# Patient Record
Sex: FEMALE | Race: WHITE | NOT HISPANIC OR LATINO | Employment: FULL TIME | URBAN - METROPOLITAN AREA
[De-identification: names, ages, dates, MRNs, and addresses within clinical notes are randomized per-mention and may not be internally consistent; named-entity substitution may affect disease eponyms.]

---

## 2019-08-10 ENCOUNTER — OFFICE VISIT (OUTPATIENT)
Dept: URGENT CARE | Facility: CLINIC | Age: 69
End: 2019-08-10

## 2019-08-10 VITALS
HEART RATE: 72 BPM | RESPIRATION RATE: 18 BRPM | BODY MASS INDEX: 25.49 KG/M2 | TEMPERATURE: 98 F | DIASTOLIC BLOOD PRESSURE: 85 MMHG | SYSTOLIC BLOOD PRESSURE: 126 MMHG | HEIGHT: 61 IN | OXYGEN SATURATION: 98 % | WEIGHT: 135 LBS

## 2019-08-10 DIAGNOSIS — H61.21 HEARING LOSS OF RIGHT EAR DUE TO CERUMEN IMPACTION: Primary | ICD-10-CM

## 2019-08-10 PROBLEM — I10 ESSENTIAL HYPERTENSION: Status: ACTIVE | Noted: 2019-08-10

## 2019-08-10 PROCEDURE — 99213 OFFICE O/P EST LOW 20 MIN: CPT | Mod: S$GLB,,, | Performed by: FAMILY MEDICINE

## 2019-08-10 PROCEDURE — 69209 EAR CERUMEN REMOVAL: ICD-10-PCS | Mod: RT,S$GLB,, | Performed by: FAMILY MEDICINE

## 2019-08-10 PROCEDURE — 69209 REMOVE IMPACTED EAR WAX UNI: CPT | Mod: RT,S$GLB,, | Performed by: FAMILY MEDICINE

## 2019-08-10 PROCEDURE — 99213 PR OFFICE/OUTPT VISIT, EST, LEVL III, 20-29 MIN: ICD-10-PCS | Mod: S$GLB,,, | Performed by: FAMILY MEDICINE

## 2019-08-10 RX ORDER — LEVOTHYROXINE SODIUM 75 UG/1
TABLET ORAL
COMMUNITY
Start: 2019-05-15

## 2019-08-10 RX ORDER — ATORVASTATIN CALCIUM 10 MG
TABLET ORAL
COMMUNITY
Start: 2019-05-16

## 2019-08-10 RX ORDER — ASPIRIN 81 MG/1
81 TABLET ORAL
COMMUNITY

## 2019-08-10 RX ORDER — METOPROLOL SUCCINATE 50 MG/1
TABLET, EXTENDED RELEASE ORAL
COMMUNITY
Start: 2017-11-16

## 2019-08-10 RX ORDER — AMLODIPINE BESYLATE 5 MG/1
TABLET ORAL
COMMUNITY
Start: 2017-10-24

## 2019-08-10 RX ORDER — VENLAFAXINE 75 MG/1
75 TABLET ORAL
COMMUNITY

## 2019-08-10 RX ORDER — CHOLECALCIFEROL (VITAMIN D3) 25 MCG
1000 TABLET ORAL
COMMUNITY

## 2019-08-10 RX ORDER — LEVOTHYROXINE SODIUM 75 UG/1
75 TABLET ORAL
COMMUNITY

## 2019-08-10 NOTE — PROGRESS NOTES
"Subjective:       Patient ID: Kaykay New is a 69 y.o. female.    Vitals:  height is 5' 1" (1.549 m) and weight is 61.2 kg (135 lb). Her oral temperature is 97.8 °F (36.6 °C). Her blood pressure is 126/85 and her pulse is 72. Her respiration is 18 and oxygen saturation is 98%.     Chief Complaint: Ear Problem    Patient present today with right ear problem . Patient states she has had fluid in her right ear for a week now. Patient states its affecting her hearing. Patient has tried taking sudafed but no relief. Patient tried draining her ear as well but no relief.     Otalgia    There is pain in the right ear. The current episode started 1 to 4 weeks ago. The problem has been gradually worsening. The pain is at a severity of 2/10. Associated symptoms include ear discharge and hearing loss. Pertinent negatives include no coughing, diarrhea, headaches, rash, sore throat or vomiting. The treatment provided no relief.       Constitution: Negative for chills, fatigue and fever.   HENT: Positive for ear pain, ear discharge and hearing loss. Negative for congestion and sore throat.    Neck: Negative for painful lymph nodes.   Cardiovascular: Negative for chest pain and leg swelling.   Eyes: Negative for double vision and blurred vision.   Respiratory: Negative for cough and shortness of breath.    Gastrointestinal: Negative for nausea, vomiting and diarrhea.   Genitourinary: Negative for dysuria, frequency, urgency and history of kidney stones.   Musculoskeletal: Negative for joint pain, joint swelling, muscle cramps and muscle ache.   Skin: Negative for color change, pale, rash and bruising.   Allergic/Immunologic: Negative for seasonal allergies.   Neurological: Negative for dizziness, history of vertigo, light-headedness, passing out and headaches.   Hematologic/Lymphatic: Negative for swollen lymph nodes.   Psychiatric/Behavioral: Negative for nervous/anxious, sleep disturbance and depression. The patient is not " nervous/anxious.        Objective:      Physical Exam   Constitutional: She is oriented to person, place, and time. She appears well-developed and well-nourished.   HENT:   Head: Normocephalic and atraumatic.   Nose: Nose normal.   Mouth/Throat: Oropharynx is clear and moist.   Rt EOC occluded with wax, Lt EOC with a moderate amount of wax-TM clear   Eyes: Pupils are equal, round, and reactive to light. EOM are normal.   Neck: Normal range of motion. Neck supple.   Cardiovascular: Normal rate, regular rhythm, normal heart sounds and intact distal pulses.   Pulmonary/Chest: Effort normal and breath sounds normal.   Neurological: She is alert and oriented to person, place, and time.   Skin: Skin is warm and dry.   Psychiatric: She has a normal mood and affect. Her behavior is normal. Judgment and thought content normal.   Nursing note and vitals reviewed.    Ear Cerumen Removal  Date/Time: 8/10/2019 1:42 PM  Performed by: Yael Francisco DO  Authorized by: Yael Francisco, DO     Consent Done?:  Yes (Verbal)  Medication Used:  Other (colase)  Location details:  Right ear  Procedure type: irrigation    Cerumen  Removal Results:  Cerumen completely removed  Patient tolerance:  Patient tolerated the procedure well with no immediate complications      Assessment:       1. Hearing loss of right ear due to cerumen impaction        Plan:         Hearing loss of right ear due to cerumen impaction    Other orders  -     Ear Cerumen Removal

## 2022-06-09 ENCOUNTER — HOSPITAL ENCOUNTER (EMERGENCY)
Facility: HOSPITAL | Age: 72
Discharge: HOME | End: 2022-06-09
Attending: EMERGENCY MEDICINE
Payer: COMMERCIAL

## 2022-06-09 VITALS
HEIGHT: 61 IN | TEMPERATURE: 99 F | WEIGHT: 130 LBS | RESPIRATION RATE: 16 BRPM | SYSTOLIC BLOOD PRESSURE: 175 MMHG | HEART RATE: 83 BPM | DIASTOLIC BLOOD PRESSURE: 90 MMHG | OXYGEN SATURATION: 97 % | BODY MASS INDEX: 24.55 KG/M2

## 2022-06-09 DIAGNOSIS — J84.9 INTERSTITIAL PNEUMONIA: Primary | ICD-10-CM

## 2022-06-09 DIAGNOSIS — M54.9 BACK PAIN: ICD-10-CM

## 2022-06-09 PROCEDURE — 63700000 PHARM REV CODE 250 ALT 637 W/O HCPCS: Performed by: PHYSICIAN ASSISTANT

## 2022-06-09 PROCEDURE — 96372 THER/PROPH/DIAG INJ SC/IM: CPT | Performed by: PHYSICIAN ASSISTANT

## 2022-06-09 PROCEDURE — 99284 EMERGENCY DEPT VISIT MOD MDM: CPT | Mod: ,,, | Performed by: PHYSICIAN ASSISTANT

## 2022-06-09 PROCEDURE — 99284 EMERGENCY DEPT VISIT MOD MDM: CPT | Mod: 25

## 2022-06-09 PROCEDURE — 99284 PR EMERGENCY DEPT VISIT,LEVEL IV: ICD-10-PCS | Mod: ,,, | Performed by: PHYSICIAN ASSISTANT

## 2022-06-09 PROCEDURE — 25000003 PHARM REV CODE 250: Performed by: PHYSICIAN ASSISTANT

## 2022-06-09 PROCEDURE — 63600175 PHARM REV CODE 636 W HCPCS: Performed by: PHYSICIAN ASSISTANT

## 2022-06-09 RX ORDER — AZITHROMYCIN 250 MG/1
500 TABLET, FILM COATED ORAL
Status: COMPLETED | OUTPATIENT
Start: 2022-06-09 | End: 2022-06-09

## 2022-06-09 RX ORDER — AZITHROMYCIN 250 MG/1
250 TABLET, FILM COATED ORAL DAILY
Qty: 4 TABLET | Refills: 0 | Status: SHIPPED | OUTPATIENT
Start: 2022-06-09

## 2022-06-09 RX ORDER — KETOROLAC TROMETHAMINE 30 MG/ML
10 INJECTION, SOLUTION INTRAMUSCULAR; INTRAVENOUS
Status: COMPLETED | OUTPATIENT
Start: 2022-06-09 | End: 2022-06-09

## 2022-06-09 RX ORDER — LIDOCAINE 50 MG/G
1 PATCH TOPICAL
Status: DISCONTINUED | OUTPATIENT
Start: 2022-06-09 | End: 2022-06-09 | Stop reason: HOSPADM

## 2022-06-09 RX ORDER — METHOCARBAMOL 500 MG/1
500-1000 TABLET, FILM COATED ORAL 2 TIMES DAILY PRN
Qty: 20 TABLET | Refills: 0 | Status: SHIPPED | OUTPATIENT
Start: 2022-06-09 | End: 2022-06-14

## 2022-06-09 RX ORDER — METHOCARBAMOL 500 MG/1
500 TABLET, FILM COATED ORAL
Status: COMPLETED | OUTPATIENT
Start: 2022-06-09 | End: 2022-06-09

## 2022-06-09 RX ADMIN — METHOCARBAMOL 500 MG: 500 TABLET ORAL at 05:06

## 2022-06-09 RX ADMIN — KETOROLAC TROMETHAMINE 10 MG: 30 INJECTION, SOLUTION INTRAMUSCULAR at 05:06

## 2022-06-09 RX ADMIN — AZITHROMYCIN MONOHYDRATE 500 MG: 250 TABLET ORAL at 06:06

## 2022-06-09 RX ADMIN — LIDOCAINE 1 PATCH: 50 PATCH CUTANEOUS at 05:06

## 2022-06-09 NOTE — ED NOTES
Pt is AAOx4. Pt states back pain started around ~Tuesday. States it's primarily on the left side. Pt reports that she has had trouble standing and walking. Pt reports taking Ibuprofen and Naproxen w/ no relief. Denies injury. Reports similar episode ~5 months ago.     Patient identifiers for [facility] 72 y.o. female checked and correct.  Chief Complaint   Patient presents with    Back Pain     Pt c/o back pain & spasms.      Past Medical History:   Diagnosis Date    Cancer 2011    breast cancer    Hypertension     Thyroid disease      Allergies reported: Review of patient's allergies indicates:  No Known Allergies

## 2022-06-09 NOTE — ED PROVIDER NOTES
Encounter Date: 6/9/2022       History     Chief Complaint   Patient presents with    Back Pain     Pt c/o back pain & spasms.      This is a 72 y.o. year old female with a PMH of HTN, breast ca and thyroid disease and cervical spine disease (s/p cervical fusion) who presents to the ED with a chief complaint of back pain. Patient reports a 2 day history of gradual onset left sided upper back pain. The pain feels like a muscle spasm and does not radiate. She describes a quick tightening with certain movements. Patient rates the pain 6/10. The pain improved with stretching, naprosyn, and ibuprofen. She tested positive for Covid 19 on May 8th after traveling. She mentions residual shortness of breath and cough, especially when lying down at night. She denies trauma, fever, chills, chest pain, nausea, abdominal pain, joint swelling, edema, calf pain, rashes, focal weakness or numbness. She is a nonsmoker.        Review of patient's allergies indicates:  No Known Allergies  Past Medical History:   Diagnosis Date    Cancer 2011    breast cancer    Hypertension     Thyroid disease      Past Surgical History:   Procedure Laterality Date    BREAST SURGERY       Family History   Problem Relation Age of Onset    No Known Problems Mother     No Known Problems Father      Social History     Tobacco Use    Smoking status: Never Smoker    Smokeless tobacco: Never Used   Substance Use Topics    Alcohol use: Not Currently     Review of Systems   Constitutional: Negative for chills and fever.   HENT: Negative for sore throat.    Respiratory: Positive for cough and shortness of breath.    Cardiovascular: Negative for chest pain.   Gastrointestinal: Negative for nausea.   Genitourinary: Negative for dysuria.   Musculoskeletal: Positive for back pain.   Skin: Negative for rash.   Neurological: Negative for weakness.   Hematological: Does not bruise/bleed easily.       Physical Exam     Initial Vitals [06/09/22 1645]   BP Pulse  Resp Temp SpO2   (!) 176/90 90 16 98.5 °F (36.9 °C) 96 %      MAP       --         Physical Exam    Constitutional: She appears well-developed and well-nourished. No distress. Face mask in place.   HENT:   Head: Atraumatic.   Right Ear: Tympanic membrane and ear canal normal.   Left Ear: Tympanic membrane and ear canal normal.   Mouth/Throat: Uvula is midline. Posterior oropharyngeal erythema present. No oropharyngeal exudate.   Eyes: Conjunctivae and EOM are normal. Pupils are equal, round, and reactive to light.   Cardiovascular: Normal rate, regular rhythm and normal heart sounds.   Pulmonary/Chest: No respiratory distress. She has decreased breath sounds in the left middle field and the left lower field. She has no wheezes. She has no rhonchi. She has rales in the left middle field and the left lower field.   Abdominal: Abdomen is soft. Bowel sounds are normal. There is no abdominal tenderness.   Musculoskeletal:      Thoracic back: Spasms and tenderness present. No bony tenderness. Normal range of motion.        Back:      Neurological: She is alert and oriented to person, place, and time. She has normal strength. No sensory deficit. Coordination and gait normal.   Skin: Skin is warm and dry. No rash noted.         ED Course   Procedures  Labs Reviewed - No data to display       Imaging Results          X-Ray Chest PA And Lateral (Final result)  Result time 06/09/22 18:27:51    Final result by Rene Singh MD (06/09/22 18:27:51)                 Impression:      1. No acute cardiopulmonary process, please see above.      Electronically signed by: Rene Singh MD  Date:    06/09/2022  Time:    18:27             Narrative:    EXAMINATION:  XR CHEST PA AND LATERAL    CLINICAL HISTORY:  Dorsalgia, unspecified    TECHNIQUE:  PA and lateral views of the chest were performed.    COMPARISON:  None    FINDINGS:  The cardiomediastinal silhouette is not enlarged.  There is no pleural effusion.  The trachea is  midline.  The lungs are symmetrically expanded bilaterally with mildly coarse interstitial attenuation projected over the left mid to lower lung zones related to attenuation from breast prosthesis..  No large focal consolidation seen.  There is no pneumothorax.  The osseous structures are remarkable for degenerative changes noting levo scoliotic curvature of the spine.  Surgical changes are noted of the cervical spine.  Surgical changes are noted of the chest wall bilaterally..                                 Medications   ketorolac injection 9.999 mg (9.999 mg Intramuscular Given 6/9/22 1749)   methocarbamoL tablet 500 mg (500 mg Oral Given 6/9/22 1749)   azithromycin tablet 500 mg (500 mg Oral Given 6/9/22 1855)     Medical Decision Making:   History:   Old Medical Records: I decided to obtain old medical records.  Clinical Tests:   Radiological Study: Ordered and Reviewed       APC / Resident Notes:   72 y.o. year old female presenting with thoracic back pain.    DDx includes but is not limited to back strain/spasm, pneumonia, costochondritis.  I considered but do not suspect ACS, PE or aortic dissection. The pain is reproducible on exam and exacerbated by movement.    ED course  CXR with interstitial attenuation over the left mid to lower lung zones. In setting of recent covid infection and rales on exam will treat with zithromax for superimposed bacterial pneumonia.    Plan  NSAIDs and prn robaxin  Zithromax  Lidoderm  Activity modification    Discussed findings and plan with patient who verbalized understanding and agrees with the plan and course of treatment. Return to ED precautions discussed. Patient is stable for discharge.                 Clinical Impression:   Final diagnoses:  [M54.9] Back pain  [J84.9] Interstitial pneumonia (Primary)          ED Disposition Condition    Discharge Stable        ED Prescriptions     Medication Sig Dispense Start Date End Date Auth. Provider    azithromycin (ZITHROMAX  Z-RUBI) 250 MG tablet Take 1 tablet (250 mg total) by mouth once daily. 4 tablet 6/9/2022  Melly Mora PA-C    methocarbamoL (ROBAXIN) 500 MG Tab Take 1-2 tablets (500-1,000 mg total) by mouth 2 (two) times daily as needed (pain/muscle spasm). 20 tablet 6/9/2022 6/14/2022 Melly Mora PA-C        Follow-up Information     Follow up With Specialties Details Why Contact Info Additional Information    [facility] Internal Medicine Schedule an appointment as soon as possible for a visit   [id number]  [facility] [address] [phone] [facility] Please park in surface lot and check in at central registration desk           Melly Mora PA-C  06/10/22 [id number]